# Patient Record
(demographics unavailable — no encounter records)

---

## 2025-06-18 NOTE — ASSESSMENT
[TextEntry] : The patient is using clobetasol 3 times weekly.  Her lichen sclerosus is well-controlled she has a traumatic "gouge" in her left frenulum that she caused with her fingernail.  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  She expressed interest in possibly working to resume penile intercourse but I explained to her that the likelihood of success is extremely small and encouraged her to seek other outlets for intimacy.  Nevertheless, if she wants to proceed with trying to resume vaginal intercourse, I am willing to work with her.   22 minutes of total time was spent on the date of the encounter. This included time for review of medical records and laboratory results, face to face time (including the physical examination counseling and coordination of care), time for patient education, treatment plans, answering questions, communicating with other doctors and for medical record documentation.  The time spent is separate from time spent on separately billed procedures.

## 2025-06-18 NOTE — PLAN
[FreeTextEntry1] : I recommended that she continue to use 3 times weekly clobetasol.  She should put Vaseline on the traumatic "gouge".  She should return in 1 year for a repeat evaluation.  Until it heals

## 2025-06-18 NOTE — PHYSICAL EXAM
[MA] : MA [FreeTextEntry2] : Viola Dyson [TextEntry] : ***General*** General Appearance: normal; Judgment and insight: normal; the patient is oriented to time, place and person; Recent and remote memory: normal; Mood and affect: normal; Respiratory effort: normal.  ***Pelvic Examination*** External genitalia: the appearance and hair distribution are normal; no lesions are appreciated. Urethra/urethral meatus: no masses or tenderness are appreciated; there is no scarring noted. Bladder: nontender; there is no fullness appreciated; no masses were palpated. Vagina: the vagina is atrophic; a scanty discharge is seen; no lesions are seen; pelvic support is adequate without any evidence of cystocele or rectocele. Cervix: normal in appearance; no overt lesions are seen. Uterus: Anteverted; normal in size, mobile, nontender, and well supported. Adnexa/parametria: nontender and not enlarged; no masses are palpated. A stool specimen was not indicated at this time.  ***colposcopy of the vulva*** Colposcopy of the external genitalia showed that the labia majora were normal. There was fusion of labial minora bilaterally. She identified the left labium majus as the location of her pain/itching/irritation. There was 8/10 vestibular tenderness of the anterior minor vestibular glands, and 5/10 vestibular tenderness of the posterior minor vestibular glands. There was no white epithelium over the clitoral lopes with no fissures noted over the clitoral lopes. There was no white epithelium on the frenulum bilaterally. There was no white epithelium in the perineum and no fissures were seen. In the left frenulum there was a 5 mm "gouge" with no erythema noted. There was evidence of other dermatopathology. There was no erythema of the introitus. There was moderate vulvar atrophy at the introitus. The introitus did not admit 2 examining fingers. There was marked introital spasm on examination. There is no evidence of steroid dermatitis.

## 2025-06-18 NOTE — HISTORY OF PRESENT ILLNESS
[FreeTextEntry1] : The patient is using clobetasol 3 times weekly.  She is asymptomatic.  Recently, she feels that she scratched herself with her fingernails.